# Patient Record
Sex: MALE | Race: WHITE | Employment: FULL TIME | URBAN - METROPOLITAN AREA
[De-identification: names, ages, dates, MRNs, and addresses within clinical notes are randomized per-mention and may not be internally consistent; named-entity substitution may affect disease eponyms.]

---

## 2017-03-10 ENCOUNTER — TELEPHONE (OUTPATIENT)
Dept: FAMILY MEDICINE | Facility: CLINIC | Age: 37
End: 2017-03-10

## 2017-03-10 ENCOUNTER — OFFICE VISIT (OUTPATIENT)
Dept: FAMILY MEDICINE | Facility: CLINIC | Age: 37
End: 2017-03-10
Payer: COMMERCIAL

## 2017-03-10 VITALS
DIASTOLIC BLOOD PRESSURE: 80 MMHG | HEART RATE: 80 BPM | BODY MASS INDEX: 27.72 KG/M2 | WEIGHT: 216 LBS | HEIGHT: 74 IN | SYSTOLIC BLOOD PRESSURE: 126 MMHG | TEMPERATURE: 97.6 F | OXYGEN SATURATION: 98 %

## 2017-03-10 DIAGNOSIS — K20.0 EOSINOPHILIC ESOPHAGITIS: ICD-10-CM

## 2017-03-10 DIAGNOSIS — R07.0 THROAT PAIN: ICD-10-CM

## 2017-03-10 DIAGNOSIS — R59.1 LYMPHADENOPATHY: Primary | ICD-10-CM

## 2017-03-10 DIAGNOSIS — J31.0 CHRONIC RHINITIS: ICD-10-CM

## 2017-03-10 LAB
DEPRECATED S PYO AG THROAT QL EIA: NORMAL
MICRO REPORT STATUS: NORMAL
SPECIMEN SOURCE: NORMAL

## 2017-03-10 PROCEDURE — 87880 STREP A ASSAY W/OPTIC: CPT | Performed by: NURSE PRACTITIONER

## 2017-03-10 PROCEDURE — 87081 CULTURE SCREEN ONLY: CPT | Performed by: NURSE PRACTITIONER

## 2017-03-10 PROCEDURE — 99214 OFFICE O/P EST MOD 30 MIN: CPT | Performed by: NURSE PRACTITIONER

## 2017-03-10 RX ORDER — CETIRIZINE HYDROCHLORIDE 10 MG/1
10 TABLET ORAL DAILY
COMMUNITY
End: 2018-01-21

## 2017-03-10 RX ORDER — FLUTICASONE PROPIONATE 220 UG/1
AEROSOL, METERED RESPIRATORY (INHALATION)
COMMUNITY
Start: 2017-02-01 | End: 2018-01-21

## 2017-03-10 RX ORDER — PANTOPRAZOLE SODIUM 40 MG/1
40 TABLET, DELAYED RELEASE ORAL
COMMUNITY
Start: 2017-02-17 | End: 2018-01-21

## 2017-03-10 NOTE — PROGRESS NOTES
SUBJECTIVE:                                                    Imer Nuñez is a 37 year old male who presents to clinic today for the following health issues:      Concern - ST     Onset: on and off since June 2016    Description:   ST    Intensity: moderate    Progression of Symptoms:  waxing and waning    Accompanying Signs & Symptoms:  Left side face hurts, neck pain left side, sinus pressure, some issues with swallowing, saw Dr Elmore for this. Has appointment for recheck with him       Previous history of similar problem:   Seen in the past for same issue, 10/2016, 12/2016    Precipitating factors:   Worsened by: after drinking maybe a couple drinks it is worse    Alleviating factors:  Improved by: none       Therapies Tried and outcome: none      The patient is a 37-year-old male seen in clinic with persistent pain that waxes and wanes, along the left side of his face, into the left ear, and down into the left side of his neck. He notes tenderness to palpation on the left anterior neck. Reports having this discomfort since last summer. At times it is more intense than others. He has been seen in clinic several times, was referred to gastroenterology for reported symptoms of difficulty swallowing having a sensation of food getting stuck in his esophagus. EGD was performed and was diagnosed with eosinophilic esophagitis. He has been taking Protonix 40 mg daily. He no longer has the substernal discomfort associated with swallowing. However, the medication has had no impact on the left side facial and neck pain.    He denies fever, diminished hearing, tinnitus, drainage from the ear. He denies sinus pain or pressure.  He is not a cigarette smoker. Has a history of chewing tobacco for 15 years, quit chewing 4 years ago    Problem list and histories reviewed & adjusted, as indicated.  Additional history: as documented    BP Readings from Last 3 Encounters:   03/10/17 126/80   01/02/17 132/88   12/14/16 122/64  "   Wt Readings from Last 3 Encounters:   03/10/17 216 lb (98 kg)   12/14/16 215 lb (97.5 kg)   10/28/16 204 lb (92.5 kg)                    Reviewed and updated as needed this visit by clinical staff  Tobacco  Allergies  Meds  Med Hx  Surg Hx  Fam Hx  Soc Hx      Reviewed and updated as needed this visit by Provider         ROS:  Constitutional, HEENT, cardiovascular, pulmonary, gi and gu systems are negative, except as otherwise noted.    OBJECTIVE:                                                    /80 (BP Location: Left arm, Patient Position: Chair, Cuff Size: Adult Regular)  Pulse 80  Temp 97.6  F (36.4  C) (Temporal)  Ht 6' 2\" (1.88 m)  Wt 216 lb (98 kg)  SpO2 98%  BMI 27.73 kg/m2  Body mass index is 27.73 kg/(m^2).  GENERAL: healthy, alert and no distress  EYES: Eyes grossly normal to inspection, PERRL and conjunctivae and sclerae normal  HENT: Right ear canal is clear, TM is pearly gray with sharp light reflex. Left ear canal is clear, TM is pearly gray with a sharp light reflex. Oropharynx reveals mild erythema of the posterior oropharynx without edema or exudate. Uvula is midline. No tenderness to palpation of the sinuses  NECK: Supple. Tender palpable lymph node at the top of the anterior cervical chain on the left. This does not feel enlarged when compared to the right side, but is tender. No other adenopathy palpable  RESP: lungs clear to auscultation - no rales, rhonchi or wheezes  CV: regular rates and rhythm, normal S1 S2, no S3 or S4 and no murmur, click or rub  ABDOMEN: soft, nontender, no hepatosplenomegaly, no masses and bowel sounds normal         ASSESSMENT/PLAN:                                                        ICD-10-CM    1. Lymphadenopathy R59.1 US Head Neck Soft Tissue     OTOLARYNGOLOGY REFERRAL   2. Throat pain R07.0 Rapid strep screen     Beta strep group A culture     US Head Neck Soft Tissue     OTOLARYNGOLOGY REFERRAL   3. Eosinophilic esophagitis K20.0 pantoprazole " (PROTONIX) 40 MG EC tablet   4. Chronic rhinitis J31.0 FLOVENT  MCG/ACT Inhaler     cetirizine (ZYRTEC) 10 MG tablet        Ultrasound of the neck was ordered to further evaluate the lymph nodes  Referred to ENT for possible laryngoscopy  Continue Protonix as ordered  Patient also takes Flovent and Zyrtec for his seasonal allergies    DAGMAR Oliveira [facility]

## 2017-03-10 NOTE — NURSING NOTE
"Chief Complaint   Patient presents with     Pharyngitis       Initial There were no vitals taken for this visit. Estimated body mass index is 27.6 kg/(m^2) as calculated from the following:    Height as of 12/14/16: 6' 2\" (1.88 m).    Weight as of 12/14/16: 215 lb (97.5 kg).  Medication Reconciliation: complete  "

## 2017-03-10 NOTE — MR AVS SNAPSHOT
After Visit Summary   3/10/2017    Imer Nuñez    MRN: [id number]           Patient Information     Date Of Birth          1980        Visit Information        Provider Department      3/10/2017 3:00 PM Caty Rivas APRN [facility]        Today's Diagnoses     Lymphadenopathy    -  1    Throat pain           Follow-ups after your visit        Additional Services     OTOLARYNGOLOGY REFERRAL       Your provider has referred you to: [facility]: [facility] [phone]   [email or web address]    Please be aware that coverage of these services is subject to the terms and limitations of your health insurance plan.  Call member services at your health plan with any benefit or coverage questions.      Please bring the following with you to your appointment:    (1) Any X-Rays, CTs or MRIs which have been performed.  Contact the facility where they were done to arrange for  prior to your scheduled appointment.   (2) List of current medications  (3) This referral request   (4) Any documents/labs given to you for this referral                  Your next 10 appointments already scheduled     Mar 14, 2017 10:30 AM CDT   US HEAD NECK SOFT TISSUE with PHUS1   [facility] ([facility])    [address] [place] [id number] [phone]           Please bring a list of your medicines (including vitamins, minerals and over-the-counter drugs). Also, tell your doctor about any allergies you may have. Wear comfortable clothes and leave your valuables at home.  You do not need to do anything special to prepare for your exam.  Please call the Imaging Department at your exam site with any questions.              Future tests that were ordered for you today     Open Future Orders        Priority Expected Expires Ordered    US Head Neck Soft Tissue Routine 6/8/2017 3/10/2018 3/10/2017        "     Who to contact     If you have questions or need follow up information about today's clinic visit or your schedule please contact [facility] directly at [phone].  Normal or non-critical lab and imaging results will be communicated to you by MyChart, letter or phone within 4 business days after the clinic has received the results. If you do not hear from us within 7 days, please contact the clinic through [employer] or phone. If you have a critical or abnormal lab result, we will notify you by phone as soon as possible.  Submit refill requests through [employer] or call your pharmacy and they will forward the refill request to us. Please allow 3 business days for your refill to be completed.          Additional Information About Your Visit        [employer] Information     [employer] lets you send messages to your doctor, view your test results, renew your prescriptions, schedule appointments and more. To sign up, go to [email or web address] . Click on \"Log in\" on the left side of the screen, which will take you to the Welcome page. Then click on \"Sign up Now\" on the right side of the page.     You will be asked to enter the access code listed below, as well as some personal information. Please follow the directions to create your username and password.     Your access code is: [email or web address]  Expires: 2017  3:26 PM     Your access code will  in 90 days. If you need help or a new code, please call your [place] clinic or [phone].        Care EveryWhere ID     This is your Care EveryWhere ID. This could be used by other organizations to access your [place] medical records  [id number]        Your Vitals Were     Pulse Temperature Height Pulse Oximetry BMI (Body Mass Index)       80 97.6  F (36.4  C) (Temporal) 6' 2\" (1.88 m) 98% 27.73 kg/m2        Blood Pressure from Last 3 Encounters:   03/10/17 126/80   17 132/88   16 122/64    Weight from Last 3 Encounters:   03/10/17 216 " lb (98 kg)   12/14/16 215 lb (97.5 kg)   10/28/16 204 lb (92.5 kg)              We Performed the Following     Beta strep group A culture     OTOLARYNGOLOGY REFERRAL     Rapid strep screen        Primary Care Provider Office Phone # Fax #    Chun Barrera [phone]       [facility] [address] [place] [address]        Thank you!     Thank you for choosing [facility]  for your care. Our goal is always to provide you with excellent care. Hearing back from our patients is one way we can continue to improve our services. Please take a few minutes to complete the written survey that you may receive in the mail after your visit with us. Thank you!             Your Updated Medication List - Protect others around you: Learn how to safely use, store and throw away your medicines at www.disposemymeds.org.          This list is accurate as of: 3/10/17  3:26 PM.  Always use your most recent med list.                   Brand Name Dispense Instructions for use    cetirizine 10 MG tablet    zyrTEC     Take 10 mg by mouth daily       FLOVENT  MCG/ACT Inhaler   Generic drug:  fluticasone          pantoprazole 40 MG EC tablet    PROTONIX     40 mg daily

## 2017-03-10 NOTE — TELEPHONE ENCOUNTER
Reason for Call: Request for a  referral:    Order or referral being requested: pt is requesting ENT referral be switched to the [place] office for insurance purposes.   Please advise.     Date needed: as soon as possible    Has the patient been seen by the PCP for this problem? YES    Additional comments:     Phone number Patient can be reached at:  Home number on file [phone] (home)    Best Time:  any    Can we leave a detailed message on this number?  YES    Call taken on 3/10/2017 at 4:04 PM by Justine Davison

## 2017-03-12 LAB
BACTERIA SPEC CULT: NORMAL
MICRO REPORT STATUS: NORMAL
SPECIMEN SOURCE: NORMAL

## 2017-03-14 ENCOUNTER — HOSPITAL ENCOUNTER (OUTPATIENT)
Dept: ULTRASOUND IMAGING | Facility: CLINIC | Age: 37
Discharge: HOME | End: 2017-03-14
Attending: NURSE PRACTITIONER | Admitting: NURSE PRACTITIONER
Payer: COMMERCIAL

## 2017-03-14 DIAGNOSIS — R59.1 LYMPHADENOPATHY: ICD-10-CM

## 2017-03-14 DIAGNOSIS — R07.0 THROAT PAIN: ICD-10-CM

## 2017-03-14 PROCEDURE — 76536 US EXAM OF HEAD AND NECK: CPT

## 2017-03-15 ENCOUNTER — OFFICE VISIT (OUTPATIENT)
Dept: OTOLARYNGOLOGY | Facility: CLINIC | Age: 37
End: 2017-03-15
Payer: COMMERCIAL

## 2017-03-15 ENCOUNTER — TELEPHONE (OUTPATIENT)
Dept: FAMILY MEDICINE | Facility: CLINIC | Age: 37
End: 2017-03-15

## 2017-03-15 VITALS — RESPIRATION RATE: 16 BRPM | WEIGHT: 216.4 LBS | BODY MASS INDEX: 27.77 KG/M2 | HEIGHT: 74 IN

## 2017-03-15 DIAGNOSIS — R59.1 LYMPHADENOPATHY: Primary | ICD-10-CM

## 2017-03-15 PROCEDURE — 99214 OFFICE O/P EST MOD 30 MIN: CPT | Performed by: OTOLARYNGOLOGY

## 2017-03-15 RX ORDER — PREDNISONE 20 MG/1
TABLET ORAL
Qty: 20 TABLET | Refills: 0 | Status: SHIPPED | OUTPATIENT
Start: 2017-03-15 | End: 2018-01-20

## 2017-03-15 RX ORDER — CLINDAMYCIN HCL 300 MG
300 CAPSULE ORAL 4 TIMES DAILY
Qty: 40 CAPSULE | Refills: 0 | Status: SHIPPED | OUTPATIENT
Start: 2017-03-15 | End: 2018-01-20

## 2017-03-15 NOTE — TELEPHONE ENCOUNTER
Reason for Call:  Request for results:    Name of test or procedure: ultra sound    Date of test of procedure: 3-14-17    Location of the test or procedure: [facility]     OK to leave the result message on voice mail or with a family member? YES    Phone number Patient can be reached at:  Cell number on file:    Telephone Information:   Mobile [phone]       Additional comments: patient calling returning call from our clinic, was told to ask for Nandini or Caty, please call and advise    Call taken on 3/15/2017 at 11:31 AM by Sofia Ge

## 2017-03-15 NOTE — NURSING NOTE
"Chief Complaint   Patient presents with     Throat Pain     Enlarged lymph nodes. US done       Initial Resp 16  Ht 1.88 m (6' 2\")  Wt 98.2 kg (216 lb 6.4 oz)  BMI 27.78 kg/m2 Estimated body mass index is 27.78 kg/(m^2) as calculated from the following:    Height as of this encounter: 1.88 m (6' 2\").    Weight as of this encounter: 98.2 kg (216 lb 6.4 oz).  Medication Reconciliation: complete   Candy Huynh CMA      "

## 2017-03-15 NOTE — PATIENT INSTRUCTIONS
General Scheduling Information  To schedule your CT/MRI scan, please contact [place] [facility] at [phone]   [address] [place] [address]    To schedule your Surgery, please contact our Specialty Schedulers at [phone]    ENT Clinic Locations Clinic Hours Telephone Number     Malissa Denise  [address] [place] [address]   Tuesday:       8:00am -- 4:00pm    Wednesday:  8:00am - 4:00pm   To schedule an appointment with   Dr. Ramirez,   please contact our   Specialty Scheduling Department at:     [phone]       Malissa Palacio  [address] [place] [address]   Friday:          8:00am - 4:00pm         Urgent Care Locations Clinic Hours Telephone Numbers     Malissa Brooks  [address] [place] [address]     Monday-Friday:     11:00pm - 9:00pm    Saturday-Sunday:  9:00am - 5:00pm   [phone]     Malissa Palacio  [address] [place] [address]     Monday-Friday:      5:00pm - 9:00pm     Saturday-Sunday:  9:00am - 5:00pm   [phone]

## 2017-03-15 NOTE — TELEPHONE ENCOUNTER
Returned call to the patient, spoke to him about his ultrasound results. He actually was just leaving his ENT appointment, so has already reviewed the results. States he was put on an antibiotic and a course of steroids to see if this completely reduces the prominent lymph nodes to normal. He has no further questions or concerns at this time, will follow-up in clinic as needed

## 2017-03-15 NOTE — PROGRESS NOTES
Chief Complaint - tonsillitis    History of Present Illness - Imer Nuñez is a 37 year old male with painful neck, left ear pain, and lymph node swelling. He points to neck tenderness both sides. Alcohol makes neck hurt worse. The patient has had one positive strep tests in the past few years. This was in June. Ever since he has had problems. Also has EE. He sometimes has dysphagia. He doesn't note reflux. Has snoring. U/s yesterday and CT scan (8/2016) images reviewed. He has some likely reactive left neck level 2 lymphadenopathy.     Past Medical History -   Patient Active Problem List   Diagnosis     CARDIOVASCULAR SCREENING; LDL GOAL LESS THAN 160     Chronic rhinitis     Eosinophilic esophagitis       Current Medications -   Current Outpatient Prescriptions:      FLOVENT  MCG/ACT Inhaler, , Disp: , Rfl:      pantoprazole (PROTONIX) 40 MG EC tablet, 40 mg daily, Disp: , Rfl:      cetirizine (ZYRTEC) 10 MG tablet, Take 10 mg by mouth daily, Disp: , Rfl:     Allergies -   Allergies   Allergen Reactions     Contrast Dye Hives and Itching     UNABLE TO HAVE CT IV DYE!  EVEN WITH PRE-MED PATIENT HAD HIVES/SOA      Seasonal Allergies        Social History -   Social History     Social History     Marital status:      Spouse name: N/A     Number of children: N/A     Years of education: N/A     Occupational History      [employer] [address]      Social History Main Topics     Smoking status: Never Smoker     Smokeless tobacco: Never Used     Alcohol use 0.0 oz/week     0 Standard drinks or equivalent per week      Comment: occasional, 1-2 on weekends     Drug use: No     Sexual activity: Yes     Partners: Female     Birth control/ protection: None     Other Topics Concern     Parent/Sibling W/ Cabg, Mi Or Angioplasty Before 65f 55m? No      Service No     Sleep Concern No     Stress Concern No     Weight Concern No     Exercise Yes     Bike Helmet Yes  "    Seat Belt Yes     Social History Narrative       Family History -   Family History   Problem Relation Age of Onset     Cardiovascular Mother      tachycardia of some type     Alcohol/Drug Father      Alcohol/Drug Maternal Grandmother      Lipids Maternal Grandfather      Alcohol/Drug Maternal Grandfather      Neurologic Disorder Maternal Grandfather      Alcohol/Drug Paternal Grandmother      CANCER Paternal Grandfather      melenoma     Alcohol/Drug Paternal Grandfather      Alzheimer Disease Paternal Grandfather      Respiratory Brother      Psychotic Disorder Other        Review of Systems - As per HPI and PMHx, otherwise 7 system review is negative.    Physical Exam  Resp 16  Ht 1.88 m (6' 2\")  Wt 98.2 kg (216 lb 6.4 oz)  BMI 27.78 kg/m2  General - The patient is in no distress.  Alert and oriented x3, answers questions and cooperates with examination appropriately.   Voice and Breathing - The patient was breathing comfortably without the use of accessory muscles. There was no wheezing, stridor, or stertor.  The patients voice was clear and strong.  Eyes - Extraocular movements intact. Sclera were not icteric or injected, conjunctiva were pink and moist.  Neurologic - Cranial nerves II-XII are grossly intact. Specifically, the facial nerve is intact, House-Brackmann grade 1 of 6.   Nose - No significant external deformity.  Nasal mucosa is pink and moist with no abnormal mucus.  The septum was midline, turbinates are of normal size and position.  No polyps, masses, or purulence.  Mouth - Examination of the oral cavity showed pink, healthy oral mucosa. No lesions or ulcerations noted.  The tongue was mobile and protrudes midline.  Oropharynx - The walls of the oropharynx were smooth, pink, moist, symmetric, and had no lesions or ulcerations.  The tonsils were 1+, some small stones. The uvula was midline and the palate raised symmetrically.   Ears - The auricles appeared normal. The external auditory canals " were nonedematous and nonerythematous. The tympanic membranes are normal in appearance, bony landmarks are intact.  No retraction, perforation, or masses.  No fluid or purulence was seen in the external canal or the middle ear.   Neck -  Palpation of the occipital, submental, submandibular, internal jugular chain, and supraclavicular nodes demonstrated some 1.5-2 cm left level two lymph node, ovoid, some tenderness.     A/P - Imer Nuñez is a 37 year old male with some tender left neck lymphadenopathy. CT and U/S don't show worrisome features, but likely reactive lymphadenopathy. He has some evidence of chronic tonsillitis, but no pain with palpation today. Some calcifications on CT. I recommend he try clindamycin and prednisone to try and help the tonsils and calm down any inflammation of the lymph nodes. If they persist, or certainly grow we may have to consider excisional lymph node biopsy.      Imer Ramirez MD  Otolaryngology  [facility]

## 2018-01-20 ENCOUNTER — HOSPITAL ENCOUNTER (EMERGENCY)
Facility: CLINIC | Age: 38
Discharge: HOME | End: 2018-01-20
Attending: EMERGENCY MEDICINE | Admitting: EMERGENCY MEDICINE
Payer: COMMERCIAL

## 2018-01-20 VITALS
BODY MASS INDEX: 26.96 KG/M2 | TEMPERATURE: 97.2 F | OXYGEN SATURATION: 99 % | WEIGHT: 210 LBS | RESPIRATION RATE: 19 BRPM | DIASTOLIC BLOOD PRESSURE: 86 MMHG | SYSTOLIC BLOOD PRESSURE: 121 MMHG | HEART RATE: 72 BPM

## 2018-01-20 DIAGNOSIS — H57.11 EYE PAIN, RIGHT: ICD-10-CM

## 2018-01-20 DIAGNOSIS — S05.91XA RIGHT EYE INJURY, INITIAL ENCOUNTER: ICD-10-CM

## 2018-01-20 PROCEDURE — 99284 EMERGENCY DEPT VISIT MOD MDM: CPT | Mod: Z6 | Performed by: EMERGENCY MEDICINE

## 2018-01-20 PROCEDURE — 25000132 ZZH RX MED GY IP 250 OP 250 PS 637: Performed by: EMERGENCY MEDICINE

## 2018-01-20 PROCEDURE — 99283 EMERGENCY DEPT VISIT LOW MDM: CPT | Performed by: EMERGENCY MEDICINE

## 2018-01-20 PROCEDURE — 25000125 ZZHC RX 250: Performed by: EMERGENCY MEDICINE

## 2018-01-20 RX ORDER — HYDROCODONE BITARTRATE AND ACETAMINOPHEN 5; 325 MG/1; MG/1
2 TABLET ORAL ONCE
Status: COMPLETED | OUTPATIENT
Start: 2018-01-20 | End: 2018-01-20

## 2018-01-20 RX ORDER — HYDROCODONE BITARTRATE AND ACETAMINOPHEN 5; 325 MG/1; MG/1
1-2 TABLET ORAL EVERY 6 HOURS PRN
Qty: 10 TABLET | Refills: 0 | Status: SHIPPED | OUTPATIENT
Start: 2018-01-20 | End: 2018-01-21

## 2018-01-20 RX ORDER — TETRACAINE HYDROCHLORIDE 5 MG/ML
1-2 SOLUTION OPHTHALMIC ONCE
Status: COMPLETED | OUTPATIENT
Start: 2018-01-20 | End: 2018-01-20

## 2018-01-20 RX ADMIN — HYDROCODONE BITARTRATE AND ACETAMINOPHEN 2 TABLET: 5; 325 TABLET ORAL at 17:44

## 2018-01-20 RX ADMIN — TETRACAINE HYDROCHLORIDE 2 DROP: 5 SOLUTION OPHTHALMIC at 17:44

## 2018-01-20 NOTE — DISCHARGE INSTRUCTIONS
Take ibuprofen for pain and inflammation.    Vicodin for severe pain.    You can use the eyedrops, 1 drop every 2 hours as needed for 24 hours only.  No longer than this.    Follow-up with ophthalmology if not improved and return at anytime for worsening, changes or concerns.    I hope you heal quickly!!

## 2018-01-20 NOTE — ED AVS SNAPSHOT
[facility] Emergency Department    [address] [place] [address]    Phone:  [phone]    Fax:  [phone]                                       Imer Nuñez   MRN: [id number]    Department:  [facility] Emergency Department   Date of Visit:  1/20/2018           After Visit Summary Signature Page     I have received my discharge instructions, and my questions have been answered. I have discussed any challenges I see with this plan with the nurse or doctor.    ..........................................................................................................................................  Patient/Patient Representative Signature      ..........................................................................................................................................  Patient Representative Print Name and Relationship to Patient    ..................................................               ................................................  Date                                            Time    ..........................................................................................................................................  Reviewed by Signature/Title    ...................................................              ..............................................  Date                                                            Time

## 2018-01-20 NOTE — ED AVS SNAPSHOT
[facility] Emergency Department    [address] [place] [address]    Phone:  [phone]    Fax:  [phone]                                       Imer Nuñez   MRN: [id number]    Department:  [facility] Emergency Department   Date of Visit:  1/20/2018           Patient Information     Date Of Birth          1980        Your diagnoses for this visit were:     Right eye injury, initial encounter     Eye pain, right        You were seen by Silvia Desai MD.      Follow-up Information     Follow up with Chun Barrera MD.    Specialty:  Family Practice    Contact information:    [address] [place] [id number] [phone]          Follow up with [facility].    Why:  (they see patients in [place] or [place].  Call for appointment if needed.)    Contact information:    [address]  [address]  [facility] [address] [phone]        Discharge Instructions       Take ibuprofen for pain and inflammation.    Vicodin for severe pain.    You can use the eyedrops, 1 drop every 2 hours as needed for 24 hours only.  No longer than this.    Follow-up with ophthalmology if not improved and return at anytime for worsening, changes or concerns.    I hope you heal quickly!!    24 Hour Appointment Hotline       To make an appointment at any [facility] clinic, call [phone]). If you don't have a family doctor or clinic, we will help you find one. [facility] clinics are conveniently located to serve the needs of you and your family.             Review of your medicines      START taking        Dose / Directions Last dose taken    HYDROcodone-acetaminophen 5-325 MG per tablet   Commonly known as:  NORCO   Dose:  1-2 tablet   Quantity:  10 tablet        Take 1-2 tablets by mouth every 6 hours as needed for moderate to severe pain   Refills:  0          Our records show that you are taking the medicines listed below. If  these are incorrect, please call your family doctor or clinic.        Dose / Directions Last dose taken    cetirizine 10 MG tablet   Commonly known as:  zyrTEC   Dose:  10 mg        Take 10 mg by mouth daily   Refills:  0        FLOVENT  MCG/ACT Inhaler   Generic drug:  fluticasone        Refills:  0        pantoprazole 40 MG EC tablet   Commonly known as:  PROTONIX   Dose:  40 mg        40 mg daily   Refills:  0                Prescriptions were sent or printed at these locations (1 Prescription)                   [facility] [place], [place] - [address]    [address] , [facility] [address]    Telephone:  [phone]   Fax:  [phone]   Hours:                  Printed at Department/Unit printer (1 of 1)         HYDROcodone-acetaminophen (NORCO) 5-325 MG per tablet                Orders Needing Specimen Collection     None      Pending Results     No orders found from 1/18/2018 to 1/21/2018.            Pending Culture Results     No orders found from 1/18/2018 to 1/21/2018.            Pending Results Instructions     If you had any lab results that were not finalized at the time of your Discharge, you can call the ED Lab Result RN at [phone]. You will be contacted by this team for any positive Lab results or changes in treatment. The nurses are available 7 days a week from 10A to 6:30P.  You can leave a message 24 hours per day and they will return your call.        Thank you for choosing [facility]       Thank you for choosing [facility] for your care. Our goal is always to provide you with excellent care. Hearing back from our patients is one way we can continue to improve our services. Please take a few minutes to complete the written survey that you may receive in the mail after you visit with us. Thank you!        [employer] Information     [employer] lets you send messages to your doctor, view your test results, renew your prescriptions, schedule appointments and more. To sign up,  "go to [email or web address] . Click on \"Log in\" on the left side of the screen, which will take you to the Welcome page. Then click on \"Sign up Now\" on the right side of the page.     You will be asked to enter the access code listed below, as well as some personal information. Please follow the directions to create your username and password.     Your access code is: [email or web address]  Expires: 2018  5:52 PM     Your access code will  in 90 days. If you need help or a new code, please call your [facility] clinic or [phone].        Care EveryWhere ID     This is your Care EveryWhere ID. This could be used by other organizations to access your [facility] medical records  [id number]        Equal Access to Services     DELMY VELASQUEZ : Teddy Haider, kennedi loo, shiva mathews, derrell hung. So [facility] [phone].    ATENCIÓN: Si habla español, tiene a hassan disposición servicios gratuitos de asistencia lingüística. Adrianne al [phone].    We comply with applicable federal civil rights laws and [place] laws. We do not discriminate on the basis of race, color, national origin, age, disability, sex, sexual orientation, or gender identity.            After Visit Summary       This is your record. Keep this with you and show to your community pharmacist(s) and doctor(s) at your next visit.                  "

## 2018-01-20 NOTE — ED NOTES
Pt was working out in the yard today and got right eye felt like he got something in it, but cont to be painful and red.  Feels like something is in there.

## 2018-01-20 NOTE — ED PROVIDER NOTES
History     Chief Complaint   Patient presents with     Eye Injury     The history is provided by the patient.     Imer Nuñez is a 38 year old male who presents to the emergency department with a right eye injury. Patient reports that about 1 hour before arrival he was doing yard work and got something in the right eye. He reports that he is unsure what it was. It is painful to open and the eye is red.  He did rinse out the eye with water.  He also tried to look under the lids to see if there was anything in the eye.  He has not been doing any welding.  He does not wear contacts or glasses.  He denies any reading or far sight vision changes except for some green discoloration in the vision.    Problem List:    Patient Active Problem List    Diagnosis Date Noted     Eosinophilic esophagitis 03/10/2017     Priority: Medium     Chronic rhinitis 05/03/2016     Priority: Medium     CARDIOVASCULAR SCREENING; LDL GOAL LESS THAN 160 10/31/2010     Priority: Medium        Past Medical History:    Past Medical History:   Diagnosis Date     Left varicocele        Past Surgical History:    Past Surgical History:   Procedure Laterality Date     ESOPHAGOSCOPY, GASTROSCOPY, DUODENOSCOPY (EGD), COMBINED N/A 1/2/2017    Procedure: COMBINED ESOPHAGOSCOPY, GASTROSCOPY, DUODENOSCOPY (EGD), BIOPSY SINGLE OR MULTIPLE;  Surgeon: Jonathon Elmore MD;  Location:  GI     HC EXCISE VARICOCELE  8/2009    left side     LAPAROSCOPIC APPENDECTOMY N/A 3/15/2016    Procedure: LAPAROSCOPIC APPENDECTOMY;  Surgeon: Royce Mathews MD;  Location: [facility] OR     SEPTOPLASTY N/A 8/9/2016    Procedure: SEPTOPLASTY;  Surgeon: Merrill López MD;  Location: [facility] OR       Family History:    Family History   Problem Relation Age of Onset     Cardiovascular Mother      tachycardia of some type     Alcohol/Drug Father      Alcohol/Drug Maternal Grandmother      Lipids Maternal Grandfather      Alcohol/Drug Maternal Grandfather      Neurologic  Disorder Maternal Grandfather      Alcohol/Drug Paternal Grandmother      CANCER Paternal Grandfather      melenoma     Alcohol/Drug Paternal Grandfather      Alzheimer Disease Paternal Grandfather      Respiratory Brother      Psychotic Disorder Other        Social History:  Marital Status:   [2]  Social History   Substance Use Topics     Smoking status: Never Smoker     Smokeless tobacco: Never Used     Alcohol use 0.0 oz/week     0 Standard drinks or equivalent per week      Comment: occasional, 1-2 on weekends        Medications:      HYDROcodone-acetaminophen (NORCO) 5-325 MG per tablet   FLOVENT  MCG/ACT Inhaler   pantoprazole (PROTONIX) 40 MG EC tablet   cetirizine (ZYRTEC) 10 MG tablet         Review of Systems  All other ROS reviewed and are negative or non-contributory except as stated in HPI.  Physical Exam   BP: 121/86  Pulse: 72  Temp: 97.2  F (36.2  C)  Resp: 19  Weight: 95.3 kg (210 lb)  SpO2: 99 %      Physical Exam   Constitutional: He appears well-developed and well-nourished.   Uncomfortable appearing male sitting in a chair   HENT:   Head: Normocephalic.   Nose: Nose normal.   Eyes: EOM and lids are normal. Pupils are equal, round, and reactive to light. Lids are everted and swept, no foreign bodies found. Right conjunctiva is injected.   Fundoscopic exam:       The right eye shows no exudate and no hemorrhage.   Slit lamp exam:       The right eye shows no corneal abrasion, no corneal flare, no corneal ulcer, no foreign body, no hyphema, no hypopyon and no fluorescein uptake.   Significant tearing   Neck: Normal range of motion. Neck supple.   Cardiovascular: Normal rate and regular rhythm.    Pulmonary/Chest: Effort normal.   Musculoskeletal: Normal range of motion.   Neurological: He is alert. He exhibits normal muscle tone.   Skin: Skin is warm and dry. He is not diaphoretic.   Psychiatric: He has a normal mood and affect. His behavior is normal.   Vitals reviewed.      ED  Course (with Medical Decision Making)    Pt seen and examined by me.  RN and EPIC notes reviewed.      I examined as noted above.  He had significant improvement in his pain with tetracaine.  I could not observe any specific corneal abrasion.  No large amount of fluorescein uptake.  The rest of the eye exam appears to be basically normal.    Discussed with patient.  Plan is to treat with pain medications.  He was given a small amount of Vicodin.  I did give him tetracaine to use sparingly over the next 24 hours.  He needs to follow-up with optometrist or ophthalmology if not improved over the next day and return at anytime for worsening, changes or concerns.       Procedures       No results found for this or any previous visit (from the past 24 hour(s)).    Medications   tetracaine (PONTOCAINE) 0.5 % ophthalmic solution 1-2 drop (2 drops Right Eye Given 1/20/18 1744)   HYDROcodone-acetaminophen (NORCO) 5-325 MG per tablet 2 tablet (2 tablets Oral Given 1/20/18 1744)     Assessments & Plan     I have reviewed the findings, diagnosis, plan and need for follow up with the patient.    Discharge Medication List as of 1/20/2018  5:52 PM      START taking these medications    Details   HYDROcodone-acetaminophen (NORCO) 5-325 MG per tablet Take 1-2 tablets by mouth every 6 hours as needed for moderate to severe pain, Disp-10 tablet, R-0, Local Print             Final diagnoses:   Right eye injury, initial encounter   Eye pain, right       Disposition: Patient discharged home in stable condition.  Plan as above.  Return for concerns.     This document serves as a record of services personally performed by Silvia Desai MD. It was created on their behalf by Shirin Bustos, a trained medical scribe. The creation of this record is based on the provider's personal observations and the statements of the patient. This document has been checked and approved by the attending provider.  Note: Chart documentation done in part with  [employer] Voice Recognition software. Although reviewed after completion, some word and grammatical errors may remain.  1/20/2018   [facility] EMERGENCY DEPARTMENT     Silvia Desai MD  01/21/18 0023

## 2018-01-21 ENCOUNTER — HOSPITAL ENCOUNTER (EMERGENCY)
Facility: CLINIC | Age: 38
Discharge: HOME | End: 2018-01-21
Attending: FAMILY MEDICINE | Admitting: FAMILY MEDICINE
Payer: COMMERCIAL

## 2018-01-21 VITALS
HEART RATE: 65 BPM | SYSTOLIC BLOOD PRESSURE: 132 MMHG | BODY MASS INDEX: 26.96 KG/M2 | OXYGEN SATURATION: 98 % | WEIGHT: 210 LBS | TEMPERATURE: 98.7 F | DIASTOLIC BLOOD PRESSURE: 85 MMHG | RESPIRATION RATE: 18 BRPM

## 2018-01-21 DIAGNOSIS — S05.01XA ABRASION OF RIGHT CORNEA, INITIAL ENCOUNTER: ICD-10-CM

## 2018-01-21 PROCEDURE — 99284 EMERGENCY DEPT VISIT MOD MDM: CPT | Mod: Z6 | Performed by: FAMILY MEDICINE

## 2018-01-21 PROCEDURE — 99283 EMERGENCY DEPT VISIT LOW MDM: CPT | Performed by: FAMILY MEDICINE

## 2018-01-21 RX ORDER — IBUPROFEN 200 MG
600 TABLET ORAL EVERY 6 HOURS PRN
Refills: 0 | COMMUNITY
Start: 2018-01-21 | End: 2018-01-24

## 2018-01-21 RX ORDER — TOBRAMYCIN AND DEXAMETHASONE 3; 1 MG/ML; MG/ML
1 SUSPENSION/ DROPS OPHTHALMIC
Qty: 2.5 ML | Refills: 0 | Status: SHIPPED | OUTPATIENT
Start: 2018-01-21 | End: 2018-01-26

## 2018-01-21 RX ORDER — ACETAMINOPHEN 500 MG
500-1000 TABLET ORAL EVERY 6 HOURS PRN
Refills: 0 | COMMUNITY
Start: 2018-01-21 | End: 2018-01-25

## 2018-01-21 RX ORDER — OXYCODONE HYDROCHLORIDE 5 MG/1
5-10 TABLET ORAL EVERY 4 HOURS PRN
Qty: 20 TABLET | Refills: 0 | Status: SHIPPED | OUTPATIENT
Start: 2018-01-21

## 2018-01-21 NOTE — ED AVS SNAPSHOT
[facility] Emergency Department    [address] [place] [address]    Phone:  [phone]    Fax:  [phone]                                       Imer Nuñez   MRN: [id number]    Department:  [facility] Emergency Department   Date of Visit:  1/21/2018           After Visit Summary Signature Page     I have received my discharge instructions, and my questions have been answered. I have discussed any challenges I see with this plan with the nurse or doctor.    ..........................................................................................................................................  Patient/Patient Representative Signature      ..........................................................................................................................................  Patient Representative Print Name and Relationship to Patient    ..................................................               ................................................  Date                                            Time    ..........................................................................................................................................  Reviewed by Signature/Title    ...................................................              ..............................................  Date                                                            Time

## 2018-01-21 NOTE — DISCHARGE INSTRUCTIONS
Please read and follow the handout(s) instructions. Return, if needed, for increased or worsening symptoms and as directed by the handout(s).    I sent your new script(s) to the [facility].    Electronically signed, Brien Caruso DO

## 2018-01-21 NOTE — ED AVS SNAPSHOT
[facility] Emergency Department    [address]     [place] [address]    Phone:  [phone]    Fax:  [phone]                                       Imer Nuñez   MRN: [id number]    Department:  [facility] Emergency Department   Date of Visit:  1/21/2018           Patient Information     Date Of Birth          1980        Your diagnoses for this visit were:     Abrasion of right cornea, initial encounter        You were seen by Brien Caruso DO.      Follow-up Information     Follow up with Chun Barrera MD.    Specialty:  Family Practice    Why:  if not improved in 2 days    Contact information:    [address]   [place] [address] [phone]          Follow up with [facility] Emergency Department.    Specialty:  EMERGENCY MEDICINE    Why:  If symptoms worsen    Contact information:    [address]   [place] [address] [phone]    Additional information:    From [address]: Exit at [address] on south side of [place]. Turn right on [address]. Turn left at stoplight on [address]. [facility] will be in view two blocks ahead        Discharge Instructions       Please read and follow the handout(s) instructions. Return, if needed, for increased or worsening symptoms and as directed by the handout(s).    I sent your new script(s) to the [facility] pharmacy.    Electronically signed, Brien Caruso DO      Discharge References/Attachments     ABRASION, CORNEAL (ENGLISH)      24 Hour Appointment Hotline       To make an appointment at any [facility] clinic, call [phone]). If you don't have a family doctor or clinic, we will help you find one. [facility] clinics are conveniently located to serve the needs of you and your family.             Review of your medicines      START taking        Dose / Directions Last dose taken    acetaminophen 500 MG tablet   Commonly known as:   TYLENOL   Dose:  500-1000 mg        Take 1-2 tablets (500-1,000 mg) by mouth every 6 hours as needed   Refills:  0        ibuprofen 200 MG tablet   Commonly known as:  ADVIL/MOTRIN   Dose:  600 mg        Take 3 tablets (600 mg) by mouth every 6 hours as needed for pain or fever (TAKE WITH FOOD) TAKE WITH FOOD AS NEEDED FOR PAIN   Refills:  0        oxyCODONE IR 5 MG tablet   Commonly known as:  ROXICODONE   Dose:  5-10 mg   Quantity:  20 tablet        Take 1-2 tablets (5-10 mg) by mouth every 4 hours as needed for pain   Refills:  0        tobramycin-dexamethasone 0.3-0.1 % ophthalmic susp   Commonly known as:  TOBRADEX   Dose:  1 drop   Quantity:  2.5 mL        Apply 1 drop to eye every 4 hours (while awake) for 5 days   Refills:  0          STOP taking        Dose Reason for stopping Comments    HYDROcodone-acetaminophen 5-325 MG per tablet   Commonly known as:  NORCO                      Prescriptions were sent or printed at these locations (4 Prescriptions)                   [facility] [address]    [address] [facility] [address]    Telephone:  [phone]   Fax:  [phone]   Hours:                  E-Prescribed (1 of 4)         tobramycin-dexamethasone (TOBRADEX) 0.3-0.1 % ophthalmic susp                 Not Printed or Sent (2 of 4)         ibuprofen (ADVIL/MOTRIN) 200 MG tablet               acetaminophen (TYLENOL) 500 MG tablet                 Printed at Department/Unit printer (1 of 4)         oxyCODONE IR (ROXICODONE) 5 MG tablet                Orders Needing Specimen Collection     None      Pending Results     No orders found from 1/19/2018 to 1/22/2018.            Pending Culture Results     No orders found from 1/19/2018 to 1/22/2018.            Pending Results Instructions     If you had any lab results that were not finalized at the time of your Discharge, you can call the ED Lab Result RN at [phone]. You will be contacted by this team for any  "positive Lab results or changes in treatment. The nurses are available 7 days a week from 10A to 6:30P.  You can leave a message 24 hours per day and they will return your call.        Thank you for choosing [facility]       Thank you for choosing [facility] for your care. Our goal is always to provide you with excellent care. Hearing back from our patients is one way we can continue to improve our services. Please take a few minutes to complete the written survey that you may receive in the mail after you visit with us. Thank you!        [employer] Information     [employer] lets you send messages to your doctor, view your test results, renew your prescriptions, schedule appointments and more. To sign up, go to [email or web address] . Click on \"Log in\" on the left side of the screen, which will take you to the Welcome page. Then click on \"Sign up Now\" on the right side of the page.     You will be asked to enter the access code listed below, as well as some personal information. Please follow the directions to create your username and password.     Your access code is: [email or web address]  Expires: 2018  5:52 PM     Your access code will  in 90 days. If you need help or a new code, please call your [facility] clinic or [phone].        Care EveryWhere ID     This is your Care EveryWhere ID. This could be used by other organizations to access your [facility] medical records  [id number]        Equal Access to Services     DELMY VELASQUEZ : Haddilma Haider, waaxda eze, qaybta kaalderrell call . So [facility] [phone].    ATENCIÓN: Si habla español, tiene a hassan disposición servicios gratuitos de asistencia lingüística. Llame al [phone].    We comply with applicable federal civil rights laws and [place] laws. We do not discriminate on the basis of race, color, national origin, age, disability, sex, sexual orientation, or gender identity.            After " Visit Summary       This is your record. Keep this with you and show to your community pharmacist(s) and doctor(s) at your next visit.

## 2018-01-21 NOTE — ED PROVIDER NOTES
History     Chief Complaint   Patient presents with     Eye Injury     HPI  Imer Nuñez is a 38 year old male who presents to the emergency room with concerns about injury that occurred yesterday to his right eye.  Patient states that he was out clearing some weeds on his property when some leaves blew into his right eye and he has had pain to the area ever since.  He states that he was seen in this emergency room yesterday and given a numbing medicine eyedrop to use for 24 hours along with some hydrocodone.  Patient states that his eyes is not improved and his pain is not controlled.  Patient states that he is unable to tolerate the hydrocodone because he gets extremely nauseated taking it.  He states that the numbing drops takes the pain away but only lasts about 20 minutes and then it comes back.  He admits to photosensitivity of the eye but denies vision loss.  He denies any headache pain.  He denies significant drainage from the eyes stating is just hearing because it is painful but he denies any thick drainage or colored drainage from the eye.  He states that still feels like he has something in the eye causing his pain.      ED Notes, ED Triage Notes, ED Provider Notes from 1/21/18 0000 to 1/21/18 10:20:50      Xiomara Pereira RN 1/21/2018 10:16 AM        Pt was clearing out weeds yesterday and injured right eye.  He was seen and no corneal abrasion was found, so pt was sent home on pain meds and tetracaine.  Drops have helped some but as soon as it wears off the pain returns.  He also has been taking pain meds w/o good pain relief.  Photosensitivity.  Nauseated from pain meds.  No HA.  Tearing has improved, but pain continues.  He had c/o background of his vision being green yesterday and that sx has almost resolved.  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             I spoke to the physician who evaluated him yesterday. She asked that I see him in recheck today. She stated she could not identify  any evidence of injury everting the eyelids and with fluorescein dye examination of the eye. She states she sent him home with tetracaine drops to use for 24 hours only and oral pain medications.      Problem List:    Patient Active Problem List    Diagnosis Date Noted     Eosinophilic esophagitis 03/10/2017     Priority: Medium     Chronic rhinitis 05/03/2016     Priority: Medium     CARDIOVASCULAR SCREENING; LDL GOAL LESS THAN 160 10/31/2010     Priority: Medium        Past Medical History:    Past Medical History:   Diagnosis Date     Left varicocele        Past Surgical History:    Past Surgical History:   Procedure Laterality Date     ESOPHAGOSCOPY, GASTROSCOPY, DUODENOSCOPY (EGD), COMBINED N/A 1/2/2017    Procedure: COMBINED ESOPHAGOSCOPY, GASTROSCOPY, DUODENOSCOPY (EGD), BIOPSY SINGLE OR MULTIPLE;  Surgeon: Jonathon Elmore MD;  Location: [facility]     HC EXCISE VARICOCELE  8/2009    left side     LAPAROSCOPIC APPENDECTOMY N/A 3/15/2016    Procedure: LAPAROSCOPIC APPENDECTOMY;  Surgeon: Royce Mathews MD;  Location: [facility]     SEPTOPLASTY N/A 8/9/2016    Procedure: SEPTOPLASTY;  Surgeon: Merrill López MD;  Location: [facility]       Family History:    Family History   Problem Relation Age of Onset     Cardiovascular Mother      tachycardia of some type     Alcohol/Drug Father      Alcohol/Drug Maternal Grandmother      Lipids Maternal Grandfather      Alcohol/Drug Maternal Grandfather      Neurologic Disorder Maternal Grandfather      Alcohol/Drug Paternal Grandmother      CANCER Paternal Grandfather      melenoma     Alcohol/Drug Paternal Grandfather      Alzheimer Disease Paternal Grandfather      Respiratory Brother      Psychotic Disorder Other        Social History:  Marital Status:   [2]  Social History   Substance Use Topics     Smoking status: Never Smoker     Smokeless tobacco: Never Used     Alcohol use 0.0 oz/week     0 Standard drinks or equivalent per week      Comment: occasional, 1-2  on weekends        Medications:      HYDROcodone-acetaminophen (NORCO) 5-325 MG per tablet         Review of Systems   Constitutional: Negative for chills and fever.   Eyes: Positive for photophobia and pain. Negative for discharge, itching and visual disturbance.   All other systems reviewed and are negative.      Physical Exam   BP: 132/85  Pulse: 65  Temp: 98.7  F (37.1  C)  Resp: 18  Weight: 95.3 kg (210 lb)  SpO2: 98 %      Physical Exam   Constitutional: He appears well-developed and well-nourished. He appears distressed (Patient appears in distress secondary to right eye pain and irritation.).   HENT:   Head: Atraumatic.   Eyes: EOM and lids are normal. Pupils are equal, round, and reactive to light. Lids are everted and swept, no foreign bodies found. Right eye exhibits no chemosis, no discharge, no exudate and no hordeolum. No foreign body present in the right eye. Left eye exhibits no discharge. Right conjunctiva is injected. Right conjunctiva has no hemorrhage. No scleral icterus.   Fundoscopic exam:       The right eye shows no exudate, no hemorrhage and no papilledema.   Slit lamp exam:       The right eye shows corneal abrasion and fluorescein uptake. The right eye shows no corneal flare, no corneal ulcer, no foreign body, no hyphema, no hypopyon and no anterior chamber bulge.       Nursing note and vitals reviewed.    Pressure measurements were taken of the right eye ×2 and were found to be 20. In the normal range.  ED Course     ED Course     Procedures               Critical Care time:  none               Assessments & Plan (with Medical Decision Making)   patient with exam findings consistent with corneal abrasions to the right eye.  Patient with history consistent with this as a mechanism for his pain symptoms.   Pressure measurements were performed of the right eye and found to be 20.  No dendritic type lesions identified on the eye.  Patient treated with medications as listed below.  He will  throw out his hydrocodone as he does not tolerate due to nausea symptoms.  The tetracaine drops that he was given yesterday were kept in the ER today and is not continued on these at this time.     I have reviewed the nursing notes.    I have reviewed the findings, diagnosis, plan and need for follow up with the patient.       Discharge Medication List as of 1/21/2018 11:48 AM      START taking these medications    Details   oxyCODONE IR (ROXICODONE) 5 MG tablet Take 1-2 tablets (5-10 mg) by mouth every 4 hours as needed for pain, Disp-20 tablet, R-0, Local PrintHe could not tolerate previous script for hydrocodone because of nausea symptoms.      ibuprofen (ADVIL/MOTRIN) 200 MG tablet Take 3 tablets (600 mg) by mouth every 6 hours as needed for pain or fever (TAKE WITH FOOD) TAKE WITH FOOD AS NEEDED FOR PAIN, R-0, OTC      acetaminophen (TYLENOL) 500 MG tablet Take 1-2 tablets (500-1,000 mg) by mouth every 6 hours as needed, R-0, OTC      tobramycin-dexamethasone (TOBRADEX) 0.3-0.1 % ophthalmic susp Apply 1 drop to eye every 4 hours (while awake) for 5 days, Disp-2.5 mL, R-0, E-Prescribe                I verbally discussed the findings of the evaluation today in the ER. I have verbally discussed with Imer the suggested treatment(s) as described in the discharge instructions and handouts. I have prescribed the above listed medications and instructed him on appropriate use of these medications.      I have verbally suggested he follow-up in his clinic or return to the ER for increased symptoms. See the follow-up recommendations documented  in the after visit summary in this visit's EPIC chart.      Final diagnoses:   Abrasion of right cornea, initial encounter       1/21/2018   [facility] EMERGENCY DEPARTMENT     Brien Caruso, DO  01/22/18 0926

## 2018-01-21 NOTE — ED NOTES
Pt was clearing out weeds yesterday and injured right eye.  He was seen and no corneal abrasion was found, so pt was sent home on pain meds and tetracaine.  Drops have helped some but as soon as it wears off the pain returns.  He also has been taking pain meds w/o good pain relief.  Photosensitivity.  Nauseated from pain meds.  No HA.  Tearing has improved, but pain continues.  He had c/o background of his vision being green yesterday and that sx has almost resolved.  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

## 2018-01-22 ASSESSMENT — ENCOUNTER SYMPTOMS
EYE DISCHARGE: 0
EYE PAIN: 1
CHILLS: 0
FEVER: 0
PHOTOPHOBIA: 1
EYE ITCHING: 0

## 2018-09-12 ENCOUNTER — OFFICE VISIT (OUTPATIENT)
Dept: URGENT CARE | Facility: RETAIL CLINIC | Age: 38
End: 2018-09-12
Payer: COMMERCIAL

## 2018-09-12 VITALS — HEART RATE: 74 BPM | DIASTOLIC BLOOD PRESSURE: 79 MMHG | SYSTOLIC BLOOD PRESSURE: 129 MMHG | TEMPERATURE: 98.6 F

## 2018-09-12 DIAGNOSIS — L08.9 LOCAL INFECTION OF SKIN AND SUBCUTANEOUS TISSUE: Primary | ICD-10-CM

## 2018-09-12 DIAGNOSIS — R21 RASH: ICD-10-CM

## 2018-09-12 PROCEDURE — 99213 OFFICE O/P EST LOW 20 MIN: CPT | Performed by: PHYSICIAN ASSISTANT

## 2018-09-12 RX ORDER — CEPHALEXIN 500 MG/1
500 CAPSULE ORAL 2 TIMES DAILY
Qty: 20 CAPSULE | Refills: 0 | Status: SHIPPED | OUTPATIENT
Start: 2018-09-12

## 2018-09-12 NOTE — MR AVS SNAPSHOT
"              After Visit Summary   2018    Imer Nuñez    MRN: [id number]           Patient Information     Date Of Birth          1980        Visit Information        Provider Department      2018 1:00 PM Ashleigh Chaves, [facility]        Today's Diagnoses     Rash    -  1    Local infection of skin and subcutaneous tissue          Care Instructions    Use careful hygiene.  Wash 2-3 x day with warm water and antibacterial soap (Dial).  Leave open to the air as much as possible.  Elevate area.  Bacitracin topically .  Take antibiotic as directed.  Eat yogurt daily while on antibiotic or take a probiotic.  If increase in redness, pain, discharge/drainage or any red streaks to seek prompt medical attention (clinic, urgent care or emergency department ).    Please FOLLOW UP at primary care clinic if not improving, new symptoms, worse or this does not resolve.  [facility]  [phone]              Follow-ups after your visit        Who to contact     You can reach your care team any time of the day by calling [phone].  Notification of test results:  If you have an abnormal lab result, we will notify you by phone as soon as possible.         Additional Information About Your Visit        MyChart Information     [facility] lets you send messages to your doctor, view your test results, renew your prescriptions, schedule appointments and more. To sign up, go to [email or web address] . Click on \"Log in\" on the left side of the screen, which will take you to the Welcome page. Then click on \"Sign up Now\" on the right side of the page.     You will be asked to enter the access code listed below, as well as some personal information. Please follow the directions to create your username and password.     Your access code is: [email or web address]  Expires: 2018  1:51 PM     Your access code will  in 90 days. If you need help or a new code, please call your " [facility] or [phone].        Care EveryWhere ID     This is your Care EveryWhere ID. This could be used by other organizations to access your [facility] medical records  [id number]        Your Vitals Were     Pulse Temperature                74 98.6  F (37  C) (Oral)           Blood Pressure from Last 3 Encounters:   09/12/18 129/79   01/21/18 132/85   01/20/18 121/86    Weight from Last 3 Encounters:   01/21/18 210 lb (95.3 kg)   01/20/18 210 lb (95.3 kg)   03/15/17 216 lb 6.4 oz (98.2 kg)              Today, you had the following     No orders found for display         Today's Medication Changes          These changes are accurate as of 9/12/18  1:51 PM.  If you have any questions, ask your nurse or doctor.               Start taking these medicines.        Dose/Directions    cephALEXin 500 MG capsule   Commonly known as:  KEFLEX   Used for:  Local infection of skin and subcutaneous tissue        Dose:  500 mg   Take 1 capsule (500 mg) by mouth 2 times daily   Quantity:  20 capsule   Refills:  0            Where to get your medicines      These medications were sent to [address]  [address], [facility] [address]     Phone:  [phone]     cephALEXin 500 MG capsule                Primary Care Provider Office Phone # Fax #    Chun Steven Barrera [phone] [address]   [facility] [address]        Equal Access to Services     DELMY VELASQUEZ AH: Hadii felipe hassan hadasho Soraymondali, waaxda luqadaha, qaybta kaalmada bisi, derrell hung. So [facility] [phone].    ATENCIÓN: Si habla español, tiene a hassan disposición servicios gratuitos de asistencia lingüística. Llame al [phone].    We comply with applicable federal civil rights laws and [place] laws. We do not discriminate on the basis of race, color, national origin, age, disability, sex, sexual orientation, or gender identity.            Thank you!     Thank you  for choosing [facility]  for your care. Our goal is always to provide you with excellent care. Hearing back from our patients is one way we can continue to improve our services. Please take a few minutes to complete the written survey that you may receive in the mail after your visit with us. Thank you!             Your Updated Medication List - Protect others around you: Learn how to safely use, store and throw away your medicines at www.disposemymeds.org.          This list is accurate as of 9/12/18  1:51 PM.  Always use your most recent med list.                   Brand Name Dispense Instructions for use Diagnosis    cephALEXin 500 MG capsule    KEFLEX    20 capsule    Take 1 capsule (500 mg) by mouth 2 times daily    Local infection of skin and subcutaneous tissue       oxyCODONE IR 5 MG tablet    ROXICODONE    20 tablet    Take 1-2 tablets (5-10 mg) by mouth every 4 hours as needed for pain    Abrasion of right cornea, initial encounter

## 2018-09-12 NOTE — PATIENT INSTRUCTIONS
Use careful hygiene.  Wash 2-3 x day with warm water and antibacterial soap (Dial).  Leave open to the air as much as possible.  Elevate area.  Bacitracin topically .  Take antibiotic as directed.  Eat yogurt daily while on antibiotic or take a probiotic.  If increase in redness, pain, discharge/drainage or any red streaks to seek prompt medical attention (clinic, urgent care or emergency department ).    Please FOLLOW UP at primary care clinic if not improving, new symptoms, worse or this does not resolve.  [facility]  [phone]

## 2018-09-12 NOTE — PROGRESS NOTES
S:  [facility]  Pt. is here because of itchy rash on rt elbow area for over a week - was itchy and yellow 'ooze' but now > redness, tenderness and back of right hand is swollen today.  He has not had previous episodes of poison ivy. States first rash was 'small red dots and then grew together and then yellow ooze'    ROS:  ENT - denies ear pain, throat pain.   No nasal congestion.  CP - no cough,SOB or chest pain.   GI/ - Appetite - ok. No nausea, vomiting or diarrhea.   No bowel or bladder changes   MSK - no joint pain or swelling.   Skin-  No hx of skin sensitivity     Past Medical History:   Diagnosis Date     Left varicocele     surgically repaired     Past Surgical History:   Procedure Laterality Date     ESOPHAGOSCOPY, GASTROSCOPY, DUODENOSCOPY (EGD), COMBINED N/A 1/2/2017    Procedure: COMBINED ESOPHAGOSCOPY, GASTROSCOPY, DUODENOSCOPY (EGD), BIOPSY SINGLE OR MULTIPLE;  Surgeon: Jonathon Elmore MD;  Location:  GI     HC EXCISE VARICOCELE  8/2009    left side     LAPAROSCOPIC APPENDECTOMY N/A 3/15/2016    Procedure: LAPAROSCOPIC APPENDECTOMY;  Surgeon: Royce Mathews MD;  [facility]     SEPTOPLASTY N/A 8/9/2016    Procedure: SEPTOPLASTY;  Surgeon: Merrill López MD;  Location: [facility]     Patient Active Problem List   Diagnosis     CARDIOVASCULAR SCREENING; LDL GOAL LESS THAN 160     Chronic rhinitis     Eosinophilic esophagitis     Current Outpatient Prescriptions   Medication     oxyCODONE IR (ROXICODONE) 5 MG tablet     No current facility-administered medications for this visit.            O: /79 (BP Location: Left arm)  Pulse 74  Temp 98.6  F (37  C) (Oral)  Oropharynx unremarkable.  Above and below rt elbow is large impetigonized areas -about 4 cm each and there is faint redness rt forearm and back of rt hand is little swollen but no warmth. A little tender. Outlined in skin marker upper arm prox area of redness.    A:    Rash  Local infection of skin and  subcutaneous tissue    P: I do not know if this was PI at the beginning but is skin infection now.   Discussed cellulitis.  Discussed contagiousness.  Prescriptions as below. Discussed indications, dosing, side affects and adverse reactions of medications with  Patient - Ceph.  Eat yogurt -take a probiotic daily when on antibiotics.  Elevate < use.  Bacitracin - antibacterial soap.  Keep open to air.  See AVS. If not improving, worse, and streaking erythema seek prompt medical attention.  Declined note for work (teacher)  Take OTC oral antihistamine    AVS given and discussed:  Patient Instructions   Use careful hygiene.  Wash 2-3 x day with warm water and antibacterial soap (Dial).  Leave open to the air as much as possible.  Elevate area.  Bacitracin topically .  Take antibiotic as directed.  Eat yogurt daily while on antibiotic or take a probiotic.  If increase in redness, pain, discharge/drainage or any red streaks to seek prompt medical attention (clinic, urgent care or emergency department ).    Please FOLLOW UP at primary care clinic if not improving, new symptoms, worse or this does not resolve.  [facility]  [phone]      Pt is comfortable with this plan.  Electronically signed,  CARMEN Chaves, PAC